# Patient Record
Sex: MALE | Race: ASIAN | NOT HISPANIC OR LATINO
[De-identification: names, ages, dates, MRNs, and addresses within clinical notes are randomized per-mention and may not be internally consistent; named-entity substitution may affect disease eponyms.]

---

## 2017-07-06 ENCOUNTER — APPOINTMENT (OUTPATIENT)
Dept: INTERNAL MEDICINE | Facility: CLINIC | Age: 32
End: 2017-07-06

## 2017-07-06 VITALS
OXYGEN SATURATION: 99 % | WEIGHT: 194 LBS | SYSTOLIC BLOOD PRESSURE: 109 MMHG | HEIGHT: 69.5 IN | TEMPERATURE: 97.9 F | RESPIRATION RATE: 18 BRPM | HEART RATE: 60 BPM | DIASTOLIC BLOOD PRESSURE: 67 MMHG | BODY MASS INDEX: 28.09 KG/M2

## 2017-07-06 DIAGNOSIS — Z78.9 OTHER SPECIFIED HEALTH STATUS: ICD-10-CM

## 2017-07-06 DIAGNOSIS — Z83.3 FAMILY HISTORY OF DIABETES MELLITUS: ICD-10-CM

## 2017-07-06 DIAGNOSIS — Z87.891 PERSONAL HISTORY OF NICOTINE DEPENDENCE: ICD-10-CM

## 2017-07-06 DIAGNOSIS — Z82.49 FAMILY HISTORY OF ISCHEMIC HEART DISEASE AND OTHER DISEASES OF THE CIRCULATORY SYSTEM: ICD-10-CM

## 2017-07-06 DIAGNOSIS — Z83.6 FAMILY HISTORY OF OTHER DISEASES OF THE RESPIRATORY SYSTEM: ICD-10-CM

## 2017-07-06 DIAGNOSIS — Z97.3 PRESENCE OF SPECTACLES AND CONTACT LENSES: ICD-10-CM

## 2017-07-06 DIAGNOSIS — Z83.49 FAMILY HISTORY OF OTHER ENDOCRINE, NUTRITIONAL AND METABOLIC DISEASES: ICD-10-CM

## 2017-07-07 ENCOUNTER — RESULT REVIEW (OUTPATIENT)
Age: 32
End: 2017-07-07

## 2017-07-07 DIAGNOSIS — Z20.5 CONTACT WITH AND (SUSPECTED) EXPOSURE TO VIRAL HEPATITIS: ICD-10-CM

## 2017-07-07 DIAGNOSIS — Z20.2 CONTACT WITH AND (SUSPECTED) EXPOSURE TO INFECTIONS WITH A PREDOMINANTLY SEXUAL MODE OF TRANSMISSION: ICD-10-CM

## 2017-07-07 DIAGNOSIS — Z23 ENCOUNTER FOR IMMUNIZATION: ICD-10-CM

## 2017-07-08 ENCOUNTER — TRANSCRIPTION ENCOUNTER (OUTPATIENT)
Age: 32
End: 2017-07-08

## 2017-07-09 ENCOUNTER — RESULT REVIEW (OUTPATIENT)
Age: 32
End: 2017-07-09

## 2017-07-09 DIAGNOSIS — R76.11 NONSPECIFIC REACTION TO TUBERCULIN SKIN TEST W/OUT ACTIVE TUBERCULOSIS: ICD-10-CM

## 2017-07-11 ENCOUNTER — RESULT REVIEW (OUTPATIENT)
Age: 32
End: 2017-07-11

## 2017-07-11 LAB
ADJUSTED MITOGEN: >10 IU/ML
ADJUSTED TB AG: 0.75 IU/ML
ALBUMIN SERPL ELPH-MCNC: 4.5 G/DL
ALP BLD-CCNC: 42 U/L
ALT SERPL-CCNC: 9 U/L
ANION GAP SERPL CALC-SCNC: 16 MMOL/L
APPEARANCE: CLEAR
AST SERPL-CCNC: 22 U/L
BASOPHILS # BLD AUTO: 0.06 K/UL
BASOPHILS NFR BLD AUTO: 1.3 %
BILIRUB SERPL-MCNC: 0.9 MG/DL
BILIRUBIN URINE: NEGATIVE
BLOOD URINE: NEGATIVE
BUN SERPL-MCNC: 7 MG/DL
C TRACH RRNA SPEC QL NAA+PROBE: NORMAL
CALCIUM SERPL-MCNC: 9.4 MG/DL
CHLORIDE SERPL-SCNC: 101 MMOL/L
CHOLEST SERPL-MCNC: 172 MG/DL
CHOLEST/HDLC SERPL: 2.6 RATIO
CO2 SERPL-SCNC: 26 MMOL/L
COLOR: YELLOW
CREAT SERPL-MCNC: 1.05 MG/DL
EOSINOPHIL # BLD AUTO: 0.18 K/UL
EOSINOPHIL NFR BLD AUTO: 3.9 %
GLUCOSE QUALITATIVE U: NORMAL MG/DL
GLUCOSE SERPL-MCNC: 86 MG/DL
HAV IGG+IGM SER QL: NONREACTIVE
HBA1C MFR BLD HPLC: 5 %
HBV SURFACE AB SER QL: REACTIVE
HBV SURFACE AG SER QL: NONREACTIVE
HCT VFR BLD CALC: 40.9 %
HCV AB SER QL: NONREACTIVE
HCV S/CO RATIO: 0.2 S/CO
HDLC SERPL-MCNC: 65 MG/DL
HGB BLD-MCNC: 13.8 G/DL
HIV1+2 AB SPEC QL IA.RAPID: NONREACTIVE
HSV 1+2 IGG SER IA-IMP: NEGATIVE
HSV 1+2 IGG SER IA-IMP: NEGATIVE
HSV1 IGG SER QL: 0.1 INDEX
HSV2 IGG SER QL: 0.15 INDEX
IMM GRANULOCYTES NFR BLD AUTO: 0.4 %
KETONES URINE: NEGATIVE
LDLC SERPL CALC-MCNC: 95 MG/DL
LEUKOCYTE ESTERASE URINE: NEGATIVE
LYMPHOCYTES # BLD AUTO: 1.39 K/UL
LYMPHOCYTES NFR BLD AUTO: 30.3 %
M TB IFN-G BLD-IMP: POSITIVE
MAN DIFF?: NORMAL
MCHC RBC-ENTMCNC: 30 PG
MCHC RBC-ENTMCNC: 33.7 GM/DL
MCV RBC AUTO: 88.9 FL
MONOCYTES # BLD AUTO: 0.39 K/UL
MONOCYTES NFR BLD AUTO: 8.5 %
N GONORRHOEA RRNA SPEC QL NAA+PROBE: NORMAL
NEUTROPHILS # BLD AUTO: 2.54 K/UL
NEUTROPHILS NFR BLD AUTO: 55.6 %
NITRITE URINE: NEGATIVE
PH URINE: 8
PLATELET # BLD AUTO: 252 K/UL
POTASSIUM SERPL-SCNC: 4.2 MMOL/L
PROT SERPL-MCNC: 7.7 G/DL
PROTEIN URINE: NEGATIVE MG/DL
QUANTIFERON GOLD NIL: 0.06 IU/ML
RBC # BLD: 4.6 M/UL
RBC # FLD: 12.7 %
SODIUM SERPL-SCNC: 143 MMOL/L
SOURCE AMPLIFICATION: NORMAL
SPECIFIC GRAVITY URINE: 1.01
T PALLIDUM AB SER QL IA: NEGATIVE
TRIGL SERPL-MCNC: 60 MG/DL
TSH SERPL-ACNC: 0.96 UIU/ML
UROBILINOGEN URINE: NORMAL MG/DL
WBC # FLD AUTO: 4.58 K/UL

## 2017-07-11 RX ORDER — PYRIDOXINE HCL (VITAMIN B6) 50 MG
50 TABLET ORAL DAILY
Qty: 30 | Refills: 8 | Status: ACTIVE | COMMUNITY
Start: 2017-07-11 | End: 1900-01-01

## 2017-07-11 RX ORDER — ISONIAZID 300 MG/1
300 TABLET ORAL DAILY
Qty: 30 | Refills: 8 | Status: ACTIVE | COMMUNITY
Start: 2017-07-11 | End: 1900-01-01

## 2017-07-17 ENCOUNTER — APPOINTMENT (OUTPATIENT)
Dept: CARDIOLOGY | Facility: CLINIC | Age: 32
End: 2017-07-17

## 2017-07-17 DIAGNOSIS — R76.12 NONSPECIFIC REACTION TO CELL MEDIATED IMMUNITY MEASUREMENT OF GAMMA INTERFERON ANTIGEN RESPONSE W/OUT ACTIVE TUBERCULOSIS: ICD-10-CM

## 2017-07-26 ENCOUNTER — TRANSCRIPTION ENCOUNTER (OUTPATIENT)
Age: 32
End: 2017-07-26

## 2017-07-26 PROBLEM — R76.12 POSITIVE QUANTIFERON-TB GOLD TEST: Status: ACTIVE | Noted: 2017-07-11

## 2018-06-01 ENCOUNTER — APPOINTMENT (OUTPATIENT)
Dept: UROLOGY | Facility: CLINIC | Age: 33
End: 2018-06-01
Payer: COMMERCIAL

## 2018-06-01 ENCOUNTER — LABORATORY RESULT (OUTPATIENT)
Age: 33
End: 2018-06-01

## 2018-06-01 VITALS
SYSTOLIC BLOOD PRESSURE: 127 MMHG | OXYGEN SATURATION: 99 % | WEIGHT: 200 LBS | RESPIRATION RATE: 17 BRPM | HEART RATE: 53 BPM | BODY MASS INDEX: 29.11 KG/M2 | TEMPERATURE: 98 F | DIASTOLIC BLOOD PRESSURE: 83 MMHG

## 2018-06-01 DIAGNOSIS — Z00.00 ENCOUNTER FOR GENERAL ADULT MEDICAL EXAMINATION W/OUT ABNORMAL FINDINGS: ICD-10-CM

## 2018-06-01 DIAGNOSIS — Z78.9 OTHER SPECIFIED HEALTH STATUS: ICD-10-CM

## 2018-06-01 PROCEDURE — 99204 OFFICE O/P NEW MOD 45 MIN: CPT

## 2018-06-18 ENCOUNTER — APPOINTMENT (OUTPATIENT)
Dept: HUMAN REPRODUCTION | Facility: CLINIC | Age: 33
End: 2018-06-18
Payer: SELF-PAY

## 2018-06-18 PROCEDURE — 89322 SEMEN ANAL STRICT CRITERIA: CPT

## 2018-07-03 ENCOUNTER — APPOINTMENT (OUTPATIENT)
Dept: UROLOGY | Facility: CLINIC | Age: 33
End: 2018-07-03
Payer: COMMERCIAL

## 2018-07-03 VITALS
WEIGHT: 201 LBS | TEMPERATURE: 98.2 F | DIASTOLIC BLOOD PRESSURE: 68 MMHG | OXYGEN SATURATION: 98 % | SYSTOLIC BLOOD PRESSURE: 110 MMHG | BODY MASS INDEX: 29.26 KG/M2 | HEART RATE: 53 BPM | RESPIRATION RATE: 17 BRPM

## 2018-07-03 PROCEDURE — 99214 OFFICE O/P EST MOD 30 MIN: CPT

## 2019-04-25 ENCOUNTER — APPOINTMENT (OUTPATIENT)
Dept: HUMAN REPRODUCTION | Facility: CLINIC | Age: 34
End: 2019-04-25

## 2019-09-03 ENCOUNTER — APPOINTMENT (OUTPATIENT)
Dept: UROLOGY | Facility: CLINIC | Age: 34
End: 2019-09-03
Payer: COMMERCIAL

## 2019-09-03 VITALS
BODY MASS INDEX: 27.07 KG/M2 | SYSTOLIC BLOOD PRESSURE: 117 MMHG | RESPIRATION RATE: 17 BRPM | DIASTOLIC BLOOD PRESSURE: 74 MMHG | HEART RATE: 68 BPM | OXYGEN SATURATION: 98 % | TEMPERATURE: 97.9 F | WEIGHT: 186 LBS

## 2019-09-03 PROCEDURE — 99213 OFFICE O/P EST LOW 20 MIN: CPT

## 2019-09-03 RX ORDER — CLOMIPHENE CITRATE 50 MG/1
50 TABLET ORAL
Qty: 45 | Refills: 3 | Status: ACTIVE | COMMUNITY
Start: 2018-07-03 | End: 1900-01-01

## 2019-09-04 NOTE — LETTER BODY
[FreeTextEntry1] : Zayra Perkins MD\par [address] [address]\par P [phone]\par F [phone]\par \par Dear Dr. Perkins,\par \par Reason for visit: Abnormal examination. Infertility. \par \par This is a 34 year-old gentleman with abnormal self examination and infertility. Patient has been unable to conceive with his 31 year-old wife for over 2 years. He returns today for follow up. Since his last visit, the patient reports taking Clomid 25 mg regularly with no side effects or difficulties with the medication. However, he denies any conception. He requests a refill for Clomid 25 mg. Patient denies any steroid or illicit drug use or chemical exposure. Patient denies any pain, difficulties, or changes in health.The patient is entirely asymptomatic. The past medical history, family history and social history are unchanged. All other review of systems are negative. Patient denies any changes in medications. Medication list was reconciled.\par  \par On examination, the patient is a healthy-appearing gentleman in no acute distress. He is alert and oriented follows commands. He has normal mood and affect. He is normocephalic. Neck is supple. Respirations are unlabored. His abdomen is soft and nontender. Bladder is nonpalpable. No CVA tenderness. Neurologically he is grossly intact. No peripheral edema. Skin without gross abnormality. \par  \par Assessment: Abnormal examination. Infertility. \par \par I counseled the patient. He has not yet repeated semen analysis, but has reported that he will repeat the semen analysis next month. I renewed the patient's prescription for Clomid 25 mg today. I encouraged the patient to continue medication regularly as directed. Risks and alternatives were discussed. I answered the patient questions. The patient will follow-up as directed and will contact me with any questions or concerns. Thank you for the opportunity to participate in the care of Mr. HARLEY. I will keep you updated on his progress. \par \par Plan: Clomid 25 mg PO QOD. Obtain followup semen analysis. Follow up as directed.

## 2019-10-15 ENCOUNTER — APPOINTMENT (OUTPATIENT)
Dept: HUMAN REPRODUCTION | Facility: CLINIC | Age: 34
End: 2019-10-15
Payer: COMMERCIAL

## 2019-10-15 PROCEDURE — 89322 SEMEN ANAL STRICT CRITERIA: CPT

## 2019-10-18 ENCOUNTER — APPOINTMENT (OUTPATIENT)
Dept: UROLOGY | Facility: CLINIC | Age: 34
End: 2019-10-18
Payer: COMMERCIAL

## 2019-10-18 VITALS
SYSTOLIC BLOOD PRESSURE: 129 MMHG | RESPIRATION RATE: 17 BRPM | BODY MASS INDEX: 26.93 KG/M2 | OXYGEN SATURATION: 100 % | TEMPERATURE: 98 F | WEIGHT: 185 LBS | DIASTOLIC BLOOD PRESSURE: 82 MMHG | HEART RATE: 70 BPM

## 2019-10-18 PROCEDURE — 99213 OFFICE O/P EST LOW 20 MIN: CPT

## 2019-10-21 NOTE — LETTER BODY
[FreeTextEntry1] : Zayra Perkins MD\par [address] [address]\par P [phone]\par F [phone]\par \par Dear Dr. Perkins,\par \par Reason for visit: Abnormal examination. Infertility. \par \par This is a 34 year-old Citizen of the [place] English-speaking gentleman with abnormal self examination and infertility. Patient has been unable to conceive with his 31 year-old wife for over 2 years. His semen analysis demonstrated poor sperm motility and concentration. His male hormone panel was within normal limits. He returns today for follow up, accompanied by his wife. Since his last visit, the patient reports taking Clomid 25 mg regularly with no side effects or difficulties with the medication. However, he continues to deny conception. He was previously diagnosed 3 years ago with tuberculosis, but does not take any medications. Patient denies any steroid or illicit drug use or chemical exposure. Patient denies any pain, difficulties, or changes in health.The patient is entirely asymptomatic. The past medical history, family history and social history are unchanged. All other review of systems are negative. Patient denies any changes in medications. Medication list was reconciled.\par  \par On examination, the patient is a healthy-appearing gentleman in no acute distress. He is alert and oriented follows commands. He has normal mood and affect. He is normocephalic. Neck is supple. Respirations are unlabored. His abdomen is soft and nontender. Bladder is nonpalpable. No CVA tenderness. Neurologically he is grossly intact. No peripheral edema. Skin without gross abnormality. \par  \par Assessment: Abnormal examination. Infertility. \par \par I counseled the patient. Recent semen analysis demonstrated poor sperm motility and decreased concentration. He has been taking Clomid 25 mg QOD with no contraception. I recommend he consult Dr. Weston Redman and obtain testicular US to further evaluate his condition.  I counseled the patient regarding the procedure. The risks and benefits were discussed. Alternatives were given. I answered the patient questions. The patient will take the necessary preparations for the procedure. The patient will follow-up as directed and will contact me with any questions or concerns. Thank you for the opportunity to participate in the care of Mr. HARLEY. I will keep you updated on his progress. \par \par Plan: Referral Dr. Weston Redman. Testicular ultrasound. Follow up as directed.

## 2019-10-21 NOTE — ADDENDUM
[FreeTextEntry1] : Entered by Herbie Grover, acting as scribe for Dr. Hector Garcia.\par \par The documentation recorded by the scribe accurately reflects the service I personally performed and the decisions made by me.

## 2019-10-23 ENCOUNTER — TRANSCRIPTION ENCOUNTER (OUTPATIENT)
Age: 34
End: 2019-10-23

## 2019-10-23 ENCOUNTER — APPOINTMENT (OUTPATIENT)
Dept: UROLOGY | Facility: CLINIC | Age: 34
End: 2019-10-23
Payer: COMMERCIAL

## 2019-10-23 VITALS
HEART RATE: 70 BPM | DIASTOLIC BLOOD PRESSURE: 72 MMHG | TEMPERATURE: 98 F | OXYGEN SATURATION: 97 % | SYSTOLIC BLOOD PRESSURE: 123 MMHG

## 2019-10-23 DIAGNOSIS — N46.9 MALE INFERTILITY, UNSPECIFIED: ICD-10-CM

## 2019-10-23 PROCEDURE — 99214 OFFICE O/P EST MOD 30 MIN: CPT

## 2019-10-23 NOTE — PHYSICAL EXAM
[General Appearance - Well Developed] : well developed [Normal Appearance] : normal appearance [General Appearance - Well Nourished] : well nourished [Well Groomed] : well groomed [General Appearance - In No Acute Distress] : no acute distress [Abdomen Soft] : soft [Abdomen Tenderness] : non-tender [Urethral Meatus] : meatus normal [Costovertebral Angle Tenderness] : no ~M costovertebral angle tenderness [Urinary Bladder Findings] : the bladder was normal on palpation [Scrotum] : the scrotum was normal [Epididymis] : the epididymides were normal [Testes Tenderness] : no tenderness of the testes [Testes Mass (___cm)] : there were no testicular masses [No Prostate Nodules] : no prostate nodules [] : no respiratory distress [Oriented To Time, Place, And Person] : oriented to person, place, and time [Affect] : the affect was normal [Normal Station and Gait] : the gait and station were normal for the patient's age [No Focal Deficits] : no focal deficits [Inguinal Lymph Nodes Enlarged Bilaterally] : inguinal [FreeTextEntry1] : 3x5 x 2x5 bilaterally; no varicocel

## 2019-10-23 NOTE — HISTORY OF PRESENT ILLNESS
[Currently Experiencing ___] :  [unfilled] [FreeTextEntry1] : Patient Ethan Whittaker is a 34 \par Job: taxi drive\par Partner Name:Lurdes,, Judith\par Partner Age:32\par Prior marriage:no\par Prior Pregnancy no\par Duration of Relationship: 7 years  (coitus interruptus)\par Years unprotected sexual intercourse: 2 years \par Time [place]: OP application x one year\par Sexual dysfunction: none\par Artificial lubricants: none\par Exposure history: none\par

## 2019-10-23 NOTE — ASSESSMENT
[FreeTextEntry1] : Mr. Whittaker is 34-year-old  who presents with a 2 year history of primary infertility.  He and his wife have been  for 7 years and utilize coitus interruptus for birth control until 2 years ago.  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  This couple states that this is unchanged from analysis one year ago.  This is not available for my review.   The patient has been treated with clomiphene citrate for one year.  Baseline gonadotropins and testosterone levels appeared normal.  He's had no assessment of his hormonal response on Clomid.\par Discussed the repeating blood studies at this point to assess his hormonal response.\par We discussed therapeutic options including:\par 1. Intrauterine insemination\par 2. in vitro fertilization\par 3. insertion of oxygen therapy to see if there can be improvement in the severe teratozoospermia.\par We discussed the significance of this.\par He has been referred to a reproductive endocrinologist for consultation.\par His last evaluation this has been reported as being normal

## 2019-10-24 ENCOUNTER — TRANSCRIPTION ENCOUNTER (OUTPATIENT)
Age: 34
End: 2019-10-24

## 2019-10-24 LAB
ESTRADIOL SERPL-MCNC: 52 PG/ML
FSH SERPL-MCNC: 11 IU/L
LH SERPL-ACNC: 9.2 IU/L
TESTOST SERPL-MCNC: 867 NG/DL
